# Patient Record
(demographics unavailable — no encounter records)

---

## 2024-10-13 NOTE — HISTORY OF PRESENT ILLNESS
[de-identified] : nasal congestion [FreeTextEntry6] : Child has mild nasal congestion no cough here for the flu vaccine

## 2024-12-24 NOTE — HISTORY OF PRESENT ILLNESS
[de-identified] : sore throat/neck pain [FreeTextEntry6] : Child started having sore throat and neck pain starting 2 days ago. Mild nasal congestion No cough Fever this morning in the office

## 2024-12-24 NOTE — HISTORY OF PRESENT ILLNESS
[de-identified] : sore throat/neck pain [FreeTextEntry6] : Child started having sore throat and neck pain starting 2 days ago. Mild nasal congestion No cough Fever this morning in the office

## 2024-12-24 NOTE — PHYSICAL EXAM
[Clear Rhinorrhea] : clear rhinorrhea [Erythematous Oropharynx] : erythematous oropharynx [Enlarged Tonsils] : enlarged tonsils [NL] : warm, clear [Exudate] : no exudate

## 2025-02-21 NOTE — HISTORY OF PRESENT ILLNESS
[EENT/Resp Symptoms] : EENT/RESPIRATORY SYMPTOMS [___ Day(s)] : [unfilled] day(s) [Sore Throat] : sore throat [Known Exposure to COVID-19] : no known exposure to COVID-19 [History of recent COVID-19 infection] : no history of recent COVID-19 infection [Fever] : no fever [Nasal Congestion] : no nasal congestion [Cough] : no cough [SOB] : no shortness of breath [Tachypnea] : no tachypnea [Decreased Appetite] : no decreased appetite [Posttussive emesis] : no posttussive emesis [Vomiting] : no vomiting [Diarrhea] : no diarrhea [Decreased Urine Output] : no decreased urine output [Rash] : no rash [Myalgia] : no myalgia [Stable] : stable

## 2025-03-25 NOTE — DISCUSSION/SUMMARY
[FreeTextEntry1] : Recommend supportive care including antipyretics, fluids, and nasal saline. Discussed risks/benefits of Tamiflu.

## 2025-03-25 NOTE — HISTORY OF PRESENT ILLNESS
[EENT/Resp Symptoms] : EENT/RESPIRATORY SYMPTOMS [Nasal congestion] : nasal congestion [Fever] : fever [Nasal Congestion] : nasal congestion [Cough] : cough [Decreased Appetite] : no decreased appetite [FreeTextEntry9] : neck pain

## 2025-03-25 NOTE — PHYSICAL EXAM
[Clear Rhinorrhea] : clear rhinorrhea [Acute Distress] : no acute distress [Alert] : alert [Tired appearing] : not tired appearing [Lethargic] : not lethargic [Toxic] : not toxic [NL] : normotonic [Normotonic] : normotonic [+2 Patella DTR] : +2 patella DTR

## 2025-05-28 NOTE — END OF VISIT
CONSULT DICTATED [Time Spent: ___ minutes] : I have spent [unfilled] minutes of time on the encounter which excludes teaching and separately reported services.

## 2025-05-28 NOTE — HISTORY OF PRESENT ILLNESS
[de-identified] : Sore throat [FreeTextEntry6] : Father states pt have sore throat from 2 days. no fever during visit

## 2025-06-12 NOTE — DISCUSSION/SUMMARY
[FreeTextEntry1] : Assessment and Plan:   - **Acute Gastroenteritis:** The patient's symptoms of abdominal pain, particularly after eating, and the presence of increased bowel sounds suggest a possible diagnosis of acute gastroenteritis. The absence of diarrhea and vomiting makes this diagnosis less certain, but it remains a possibility given the gastrointestinal focus of the symptoms.     - Observe for a few days to see how symptoms evolve      - Recommend a bland diet, avoiding spicy, fatty, or acidic foods      - Ensure adequate hydration      - Blood tests to be conducted to rule out other potential causes      - Follow-up within a week if symptoms persist or worsen      - Consider further diagnostic tests if symptoms do not improve    - **Functional Abdominal Pain:** Given the patient's age, the cyclical nature of the pain (worse with eating), and the absence of red flag symptoms, functional abdominal pain is a possible diagnosis. The recent onset and severity of symptoms make this less likely to be the primary diagnosis, but it should be considered if other causes are ruled out.     - Educate patient and family about functional abdominal pain      - Recommend stress reduction techniques if applicable      - Consider referral to a pediatric gastroenterologist if symptoms persist

## 2025-06-12 NOTE — PHYSICAL EXAM
[Acute Distress] : no acute distress [Alert] : alert [Tired appearing] : not tired appearing [Clear] : right tympanic membrane clear [Erythematous Oropharynx] : nonerythematous oropharynx [Soft] : soft [Tender] : nontender [Distended] : nondistended [Tenderness with Palpation] : no tenderness with palpation [McBurney's point tenderness] : no McBurney's point tenderness [Rebound tenderness] : no rebound tenderness [Psoas Sign Positive] : psoas sign negative [Obturator Sign Positive] : obturator sign negative [Normotonic] : normotonic [NL] : warm, clear [FreeTextEntry9] : hyperactive bowel sounds, no acute abdomen [de-identified] : no focal deficits

## 2025-06-12 NOTE — REVIEW OF SYSTEMS
[Negative] : Genitourinary [Fever] : no fever [Malaise] : no malaise [Change in Weight] : no change in weight [Night Sweats] : no night sweats [Ear Pain] : no ear pain [Nasal Discharge] : no nasal discharge [Nasal Congestion] : no nasal congestion [Tachypnea] : not tachypneic [Wheezing] : no wheezing [Cough] : no cough [Appetite Changes] : appetite changes [PO Intolerance] : PO intolerance [Vomiting] : no vomiting [Diarrhea] : no diarrhea [Constipation] : no constipation [Gaseous] : not gaseous [Abdominal Pain] : abdominal pain [Rash] : no rash [Dysuria] : no dysuria [Polyuria] : no polyuria

## 2025-06-12 NOTE — HISTORY OF PRESENT ILLNESS
[Abdominal Pain] : abdominal pain [Sick Contacts: ___] : no sick contacts [Change in diet] : no change in diet [Recent travel: ___] : no recent travel [Fever] : no fever [Nausea] : no nausea [Vomiting] : no vomiting [Diarrhea] : no diarrhea [Constipation] : no constipation [Rash] : no rash [de-identified] : abdominal pain [FreeTextEntry6] : Kenyetta presents with complaints of abdominal pain that has been ongoing for several days. The pain is described as persistent, worsening after meals, and located in the central abdominal area. Nicci reports that the pain begins with the first few bites of food and rates it as an 8 out of 10 in severity when eating. Currently, she rates the pain as a 6 out of 10. She has experienced some nausea but no vomiting. Her appetite has decreased, but she forces herself to eat. Bowel movements are reported as normal, with no diarrhea. Nicci is currently menstruating, but states that the abdominal pain predates her period and feels different from any menstrual cramps. She denies any respiratory symptoms or fever. The patient recalls a similar episode two years ago during a trip to [facility], but states that this current episode feels different and less severe.

## 2025-07-11 NOTE — DISCUSSION/SUMMARY
[FreeTextEntry1] : Symptoms likely due to viral URI. Recommend supportive care including antipyretics, fluids, and nasal saline followed by nasal suction. Return if symptoms worsen or persist. discussed in detail all travel guidance when to use antibiotics use of ear drops  fever control how to treat allergic reactions and what to look out for summer tips dehydration and to follow hydration status

## 2025-07-11 NOTE — HISTORY OF PRESENT ILLNESS
[FreeTextEntry6] : follow up for cough cold and congestion is doing well overall no distress  also traveling and wanted to be checked and cleared and prepared for travel as comonly gets ear infection and OE